# Patient Record
Sex: MALE
[De-identification: names, ages, dates, MRNs, and addresses within clinical notes are randomized per-mention and may not be internally consistent; named-entity substitution may affect disease eponyms.]

---

## 2019-02-12 ENCOUNTER — HOSPITAL ENCOUNTER (EMERGENCY)
Dept: HOSPITAL 80 - CED | Age: 54
Discharge: HOME | End: 2019-02-12
Payer: COMMERCIAL

## 2019-02-12 VITALS — DIASTOLIC BLOOD PRESSURE: 65 MMHG | SYSTOLIC BLOOD PRESSURE: 109 MMHG

## 2019-02-12 DIAGNOSIS — I10: ICD-10-CM

## 2019-02-12 DIAGNOSIS — Z79.4: ICD-10-CM
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
## 2019-02-12 DIAGNOSIS — S22.31XA: Primary | ICD-10-CM

## 2019-02-12 DIAGNOSIS — E11.9: ICD-10-CM
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
## 2019-02-12 NOTE — EDPHY
H & P


Time Seen by Provider: 02/12/19 00:21


HPI/ROS: 


CC:  Right-sided rib pain status post fall





HPI:  This 53-year-old male with past medical history of hypertension, gout, 

diabetes and prior stroke presents to the emergency room today complaining of 

sharp, right-sided rib pain after he fell to the ground while wrestling with 

his son.  He states he glanced off a chair but did not hit his ribs on the 

chair.  He fell onto his back and had immediate pain on his right ribs just 

under the nipple.  It radiates through to his back.  When he takes a deep 

breath he rates the pain at 6/10.  It does not hurt when he does not take a 

deep breath.  His significant other gave him a Flexeril without relief.  He has 

not had a recent illness.  He had no trauma to the side of the chest 

previously.  He has had no long trips or travel and no leg pain or swelling.





REVIEW OF SYSTEMS:


Constitutional:  No fever, no chills.


Eyes:  No discharge.


ENT:  No sore throat.


Respiratory:  See HPI.


Cardiac:  No chest pressure, no palpitations.


Gastrointestinal:  No abdominal pain, no vomiting.


Genitourinary:  No hematuria.


Musculoskeletal:  No back pain.


Skin:  c/w psoriasis


Neurological:  No headache.








Past Medical/Surgical History: 





PMH:  Hypertension, diabetes, psoriasis, gout, stroke with minimal residual 

verbal deficit





PSH:  Skull fracture, right knee surgery, hernia repair





FH:  Mother had hypertension; does not think his father had health issues





No known drug allergies





Medications include Zestril, clonidine, amlodipine, metformin, allopurinol, 

Tradjenta


Social History: 


No tobacco products; drinks ETOH (had 5 beers this evening); usually uses 

edibles but none this evening











Smoking Status: Unknown if ever smoked


Physical Exam: 


General Appearance:  Alert, moderate distress.


Eyes:  Pupils equal and round no pallor or injection.


ENT, Mouth:  Mucous membranes are moist.


Respiratory:  There are no retractions, lungs are clear to auscultation.


Chest Wall: pain with palpation right ribs just under the nipple line; no 

ecchymosis


Cardiovascular:  Regular rate and rhythm.


Gastrointestinal:  Abdomen is soft and nontender, no masses, bowel sounds 

normal.


Neurological:  Awake and alert, sensory and motor exams grossly normal.


Skin:  Warm and dry, patches of psoriasis


Musculoskeletal:  Neck is supple nontender. No midline back pain


Extremities are symmetrical, full range of motion.


Psychiatric:  Patient is oriented X 3, there is no agitation.


DIFFERENTIAL DIAGNOSIS:  After history and physical exam differential diagnosis 

was considered for but not limited to and in no particular order: chest wall 

contusion, rib contusion, rib fracture, pneumothorax





Constitutional: 


 Initial Vital Signs











Temperature (C)  96.6 F L  02/12/19 00:24


 


Heart Rate  93   02/12/19 00:24


 


Respiratory Rate  18   02/12/19 00:24


 


Blood Pressure  92/61 L  02/12/19 00:24


 


O2 Sat (%)  95   02/12/19 00:24








 











O2 Delivery Mode               Room Air














Allergies/Adverse Reactions: 


 





No Known Allergies Allergy (Verified 11/07/15 19:46)


 








Home Medications: 














 Medication  Instructions  Recorded


 


Clonidine HCl  11/07/15














Medical Decision Making





- Diagnostics


Imaging Results: 





Chest/right ribs:  No rib fracture or pneumothorax


Imaging: I viewed and interpreted images myself


ED Course/Re-evaluation: 





Patient was seen and examined.  Vital signs reviewed.  His blood pressure was 

running on the slightly low side and he was counseled to monitor his blood 

pressure discuss this with his primary care provider.  He may need a blood 

pressure medication adjustment.  His O2 sats were 95% on room air.  His x-rays 

were negative for pneumothorax or rib fracture.  He was given a take-home pack 

of Norco for pain unrelieved by ibuprofen.  He was given an incentive 

spirometer.  He was also advised to return to the emergency room or get a 

recheck with his primary care provider should he develop increased shortness of 

breath or fever.





- Data Points


Medications Given: 


 








Discontinued Medications





Hydrocodone Bitart/Acetaminophen (Norco 5/325mg Prepack#6)  1 btl TAKEHOME 

EDNOW ONE


   Stop: 02/12/19 00:46


   Last Admin: 02/12/19 00:51 Dose:  1 btl


Hydrocodone Bitart/Acetaminophen (Norco 5/325)  2 tab PO EDNOW ONE


   Stop: 02/12/19 00:46


   Last Admin: 02/12/19 00:51 Dose:  2 tab








Departure





- Departure


Disposition: Home, Routine, Self-Care


Clinical Impression: 


 Contusion of rib on right side





Condition: Good


Instructions:  Hydrocodone/Acetaminophen (By mouth), Rib Contusion (ED)


Additional Instructions: 


Take 10 deep breaths every hour while awake.  Motrin for pain.  Norco for 

severe pain only.  Follow up with her primary care provider or return to the 

emergency room if you have increased shortness of breath or fever or any other 

concerns.


Referrals: 


Patient,NotPresent [Primary Care Provider] - As per Instructions